# Patient Record
Sex: FEMALE | Race: BLACK OR AFRICAN AMERICAN | NOT HISPANIC OR LATINO | Employment: UNEMPLOYED | URBAN - METROPOLITAN AREA
[De-identification: names, ages, dates, MRNs, and addresses within clinical notes are randomized per-mention and may not be internally consistent; named-entity substitution may affect disease eponyms.]

---

## 2017-02-16 ENCOUNTER — CLINICAL SUPPORT (OUTPATIENT)
Dept: OBSTETRICS AND GYNECOLOGY | Facility: CLINIC | Age: 27
End: 2017-02-16
Payer: MEDICAID

## 2017-02-16 PROCEDURE — 96372 THER/PROPH/DIAG INJ SC/IM: CPT | Mod: PBBFAC,PO

## 2017-02-16 RX ADMIN — MEDROXYPROGESTERONE ACETATE 150 MG: 150 INJECTION, SUSPENSION INTRAMUSCULAR at 02:02

## 2017-02-16 NOTE — PROGRESS NOTES
Depo Provera 150 mg given Im right deltoid ( per pt.'s request). Order per Dr. Bhatti on 9/15/16. Advised pt to remain 15 min. After injection. No complications. Next injection due between 5/4/17 and 5/18/17. Next appt scheduled for  5/10/17.

## 2017-03-13 ENCOUNTER — OFFICE VISIT (OUTPATIENT)
Dept: OPHTHALMOLOGY | Facility: CLINIC | Age: 27
End: 2017-03-13
Payer: MEDICAID

## 2017-03-13 DIAGNOSIS — H40.003 GLAUCOMA SUSPECT, BILATERAL: Primary | ICD-10-CM

## 2017-03-13 DIAGNOSIS — H52.201 ASTIGMATISM, RIGHT: ICD-10-CM

## 2017-03-13 PROCEDURE — 92250 FUNDUS PHOTOGRAPHY W/I&R: CPT | Mod: PBBFAC,PO | Performed by: OPTOMETRIST

## 2017-03-13 PROCEDURE — 92004 COMPRE OPH EXAM NEW PT 1/>: CPT | Mod: S$PBB,,, | Performed by: OPTOMETRIST

## 2017-03-13 PROCEDURE — 92015 DETERMINE REFRACTIVE STATE: CPT | Mod: ,,, | Performed by: OPTOMETRIST

## 2017-03-13 PROCEDURE — 99999 PR PBB SHADOW E&M-EST. PATIENT-LVL I: CPT | Mod: PBBFAC,,, | Performed by: OPTOMETRIST

## 2017-03-13 PROCEDURE — 99211 OFF/OP EST MAY X REQ PHY/QHP: CPT | Mod: PBBFAC,PO | Performed by: OPTOMETRIST

## 2017-03-13 NOTE — PROGRESS NOTES
HPI     New pt to [facility]. Pt here for new glasses rx. Last exam was 3-4   years ago. Pt moved from [place] in 2015. Pt states that she has astigmatism   in both eyes. Pt works from home, so she experiences eye fatigue a lot.        Last edited by Evaristo López, Patient Care Assistant on 3/13/2017  2:54   PM.         Assessment /Plan     For exam results, see Encounter Report.    Glaucoma suspect, bilateral  -     Color Fundus Photography - OU - Both Eyes  -     Posterior Segment OCT Optic Nerve- Both eyes; Future  -     Lopez Visual Field - OU - Extended - Both Eyes; Future  Suspect based on increased IOP OS>OD    Astigmatism, right  Eyeglass Final Rx     Eyeglass Final Rx      Sphere Cylinder Axis   Right -0.50 +0.50 170   Left [place]         Expiration Date:  3/14/2018              RTC 4-6 weeks for 24-2VF, gOCT pach, gonio and IOP check  Discussed above and all questions were answered.

## 2017-06-26 ENCOUNTER — TELEPHONE (OUTPATIENT)
Dept: OBSTETRICS AND GYNECOLOGY | Facility: CLINIC | Age: 27
End: 2017-06-26

## 2017-06-26 NOTE — TELEPHONE ENCOUNTER
----- Message from Naeyli Marie sent at 6/22/2017  1:10 PM CDT -----  needs kelle sanz...[phone]